# Patient Record
Sex: FEMALE | Race: WHITE
[De-identification: names, ages, dates, MRNs, and addresses within clinical notes are randomized per-mention and may not be internally consistent; named-entity substitution may affect disease eponyms.]

---

## 2019-12-19 ENCOUNTER — HOSPITAL ENCOUNTER (INPATIENT)
Dept: HOSPITAL 74 - YASAS | Age: 29
LOS: 4 days | Discharge: HOME | DRG: 773 | End: 2019-12-23
Attending: ALLERGY & IMMUNOLOGY | Admitting: ALLERGY & IMMUNOLOGY
Payer: COMMERCIAL

## 2019-12-19 VITALS — BODY MASS INDEX: 22.8 KG/M2

## 2019-12-19 DIAGNOSIS — Z88.0: ICD-10-CM

## 2019-12-19 DIAGNOSIS — F11.23: Primary | ICD-10-CM

## 2019-12-19 DIAGNOSIS — F17.210: ICD-10-CM

## 2019-12-19 DIAGNOSIS — J45.909: ICD-10-CM

## 2019-12-19 DIAGNOSIS — F13.230: ICD-10-CM

## 2019-12-19 DIAGNOSIS — E03.9: ICD-10-CM

## 2019-12-19 DIAGNOSIS — Z91.14: ICD-10-CM

## 2019-12-19 DIAGNOSIS — F19.282: ICD-10-CM

## 2019-12-19 DIAGNOSIS — K21.9: ICD-10-CM

## 2019-12-19 LAB
ALBUMIN SERPL-MCNC: 3.7 G/DL (ref 3.4–5)
ALP SERPL-CCNC: 54 U/L (ref 45–117)
ALT SERPL-CCNC: 18 U/L (ref 13–61)
ANION GAP SERPL CALC-SCNC: 9 MMOL/L (ref 8–16)
AST SERPL-CCNC: 7 U/L (ref 15–37)
BILIRUB SERPL-MCNC: 0.2 MG/DL (ref 0.2–1)
BUN SERPL-MCNC: 18.9 MG/DL (ref 7–18)
CALCIUM SERPL-MCNC: 9 MG/DL (ref 8.5–10.1)
CHLORIDE SERPL-SCNC: 104 MMOL/L (ref 98–107)
CO2 SERPL-SCNC: 28 MMOL/L (ref 21–32)
CREAT SERPL-MCNC: 0.9 MG/DL (ref 0.55–1.3)
DEPRECATED RDW RBC AUTO: 13.6 % (ref 11.6–15.6)
GLUCOSE SERPL-MCNC: 61 MG/DL (ref 74–106)
HCT VFR BLD CALC: 48.1 % (ref 32.4–45.2)
HGB BLD-MCNC: 15.8 GM/DL (ref 10.7–15.3)
MCH RBC QN AUTO: 31.2 PG (ref 25.7–33.7)
MCHC RBC AUTO-ENTMCNC: 32.9 G/DL (ref 32–36)
MCV RBC: 95 FL (ref 80–96)
PLATELET # BLD AUTO: 315 K/MM3 (ref 134–434)
PMV BLD: 10.9 FL (ref 7.5–11.1)
POTASSIUM SERPLBLD-SCNC: 3.5 MMOL/L (ref 3.5–5.1)
PROT SERPL-MCNC: 7.3 G/DL (ref 6.4–8.2)
RBC # BLD AUTO: 5.07 M/MM3 (ref 3.6–5.2)
SODIUM SERPL-SCNC: 140 MMOL/L (ref 136–145)
WBC # BLD AUTO: 11.2 K/MM3 (ref 4–10)

## 2019-12-19 PROCEDURE — HZ2ZZZZ DETOXIFICATION SERVICES FOR SUBSTANCE ABUSE TREATMENT: ICD-10-PCS | Performed by: ALLERGY & IMMUNOLOGY

## 2019-12-19 RX ADMIN — Medication SCH MG: at 21:09

## 2019-12-19 RX ADMIN — ALBUTEROL SULFATE PRN PUFF: 90 AEROSOL, METERED RESPIRATORY (INHALATION) at 17:06

## 2019-12-19 NOTE — PN
Teaching Attending Note


Name of Resident: Estrellita Parham





ATTENDING PHYSICIAN STATEMENT





I saw and evaluated the patient.


I reviewed the resident's note and discussed the case with the resident.


I agree with the resident's findings and plan as documented.








SUBJECTIVE:pt here requesting detox from  opiate use, reports 25-30 bags/day  

via  inhalation , denies IV  use since 6  mo  ago , relapsed after  finding out

  her father  has  pulmonary dz  , prior sobriety x  3 years  , latest use  

this morning 1  bag  , yesterday 1  bag, s/p hospitalization @ [facility]  for  

SOB , dx  w/ asthma , states was given 10 mg Methadone daily  latest dose 

yesterday  @ noon . 


benzo  - 5  /day  , reports w/d seizure several  years  ago   , latest use 

yesterday  , states was given Ativan @ [facility] while hospitalized . 


  has  6  yr  old dtr  , custody w/  [employer]  [facility] 


LMP -  spotting, has IUD 








OBJECTIVE: wnwd 


Resp  : wheezing all lung fields  , no distress  


 Vital Signs - 24 hr











  19





  12:06


 


Temperature 96.8 F L


 


Pulse Rate 64


 


Respiratory 20





Rate 


 


Blood Pressure 116/72

















 This report was requested by: Barbie Gonzalez | Reference #: [id number]


Others' Prescriptions


Patient Name:    Katelyn Bay    YOB: 1990


Address:    [address] [place] [address]    Sex:    Female


Rx Written    Rx Dispensed    Drug    Quantity    Days Supply    Prescriber Name


2019    dextroamp-amphetamin 30 mg tab    60    30    

SantiaSaulo christensen MD


2019    dextroamp-amphetamin 30 mg tab    60    30    

SantiaSaulo christensen MD


2019    dextroamp-amphetamin 30 mg tab    60    30    

SantiaSaulo christensen MD


2019    dextroamp-amphetamin 30 mg tab    60    30    

KimiaSaulo christensen MD


2019    dextroamp-amphetamin 30 mg tab    60    30    

SantiaSaulo christensen MD


04/10/2019    2019    dextroamp-amphetamin 30 mg tab    60    30    

SantiaSaulo christensen MD


2019    03/15/2019    dextroamp-amphetamin 30 mg tab    60    30    

Saulo Mortensen MD


02/15/2019    2019    dextroamp-amphetamin 30 mg tab    60    30    

Saulo Mortensen MD


2019    dextroamp-amphetamin 30 mg tab    60    30    

Saulo Mortensen MD


2018    dextroamp-amphetamin 30 mg tab    60    30    

Saulo Mortensen MD








ASSESSMENT AND PLAN: OUD  - Methadone detox . 


Sedative dependence - Librium detox

## 2019-12-19 NOTE — HP
COWS





- Scale


Resting Pulse: 0= [place] 80 or Below


Sweatin= Chills/Flushing


Restless Observation: 0= Sits Still


Pupil Size: 0= Normal to Room Light


Bone or Joint Aches: 1= Mild Discomfort


Runny Nose/ Eye Tearin= None


GI Upset > 30mins: 2= Nausea/Diarrhea


Tremor Observation: 0= None


Yawning Observation: 0= None


Anxiety or Irritability: 1=Feels Anxious/Irritable


Goose Flesh Skin: 0=Smooth Skin


COWS Score: 5





CIWA Score





- Admission Criteria


[facility]AS Guidelines: Admission for Medically Managed Detox: 


Requires at least one of the followin. CIWA greater than 12


2. Seizures within the past 24 hours


3. Delirium tremens within the past 24 hours


4. Hallucinations within the past 24 hours


5. Acute intervention needed for co  occurring medical disorder


6. Acute intervention needed for co  occurring psychiatric disorder


7. Severe withdrawal that cannot be handled at a lower level of care (continued


    vomiting, continued diarrhea, abnormal vital signs) requiring intravenous


    medication and/or fluids


8. Pregnancy








Admitting History and Physical





- Primary Care Physician


PCP: none





- Admission


Chief Complaint: heroin and benzo detox


History of Present Illness: 





Pt states she was clean for 3yrs, then 3 mo ago her dad got sick and she 

relapsed. She states she is using heroin and benzos daily. She uses 25-30 bags 

of heroin/ day which she is snorting. In the past she has shot up the heroin 

but not in the last 3mo. She is also taking benzos every day "at least 5 pills/

day" but amounts vary depending on what she can get. Pt currently works as a 

 which is how she pays for her drugs. Her last use of heroin was 1 bag 

this morning. She was also recently hospitalized and discharged from [facility] last night. There she was given methadone 10mg, and ativan to prevent 

withdrawals. The patient has 1 child who is 8 years old but she does not have 

custody. The child lives with her mother, who has full custody, [facility] in 

[place], [place]. 





Pt reports she does not have menstrual periods as she has an IUD





- Past Medical History


Pulmonary: Yes: Asthma


...LMP: 10/09/14


Psych: Yes: Anxiety, Depression


Endocrine: Yes: Hypothyroidism





- Smoking History


Smoking history: Current every day smoker


Aproximately how many cigarettes per day: 10





- Alcohol/Substance Use


Hx Alcohol Use: No





Admission ROS [facility]





- [facility]


Allergies/Adverse Reactions: 


 Allergies











Allergy/AdvReac Type Severity Reaction Status Date / Time


 


Penicillins Allergy   Verified 19 12:02














- Ebola screening


Have you traveled outside of the country in the last 21 days: No (N)


Have you had contact with anyone from an Ebola affected area: No


Do you have a fever: No





- Review of Systems


Constitutional: Chills, Diaphoresis, Changes in sleep


EENT: reports: Nose Congestion, Sinus Pressure.  denies: Eye Pain, Ear Pain


Respiratory: reports: Cough, Shortness of Breath (recently diagnosed with asthma

, takes albuterol)


Cardiac: denies: Chest Pain, Lightheadedness, Palpitations, Syncope


GI: reports: Constipated, Nausea.  denies: Diarrhea, Vomiting


Musculoskeletal: reports: Back Pain (low back pain), Joint Pain, Muscle Weakness

, Joint Stiffness


Integumentary: reports: No Symptoms Reported


Neuro: reports: No Symptoms reported


Endocrine: reports: Intolerance to Cold


Hematology: reports: No Symptoms Reported


Psychiatric: reports: Anxious, Depressed


Other Systems: Reviewed and Negative





Patient History





- Patient Medical History


Hx Anemia: No


Hx Asthma: Yes (ALBUTEROL SINCE 2014)


Hx Chronic Obstructive Pulmonary Disease (COPD): No


Hx Cancer: No


Hx Cardiac Disorders: No


Hx Congestive Heart Failure: No


Hx Hypertension: No


Hx Hypercholesterolemia: No


Hx Pacemaker: No


HX Cerebrovascular Accident: No


Hx Seizures: No


Hx Dementia: No


Hx Diabetes: No


Hx Gastrointestinal Disorders: Yes (DYSPEPSIA)


Hx Liver Disease: No


Hx Genitourinary Disorders: No


Hx Sexually Transmitted Disorders: No


Hx Renal Disease (ESRD): No


Hx Thyroid Disease: Yes (HYPOTHYROIDISM,NON-COMPLIANT WITH MEDS)


Hx Human Immunodeficiency Virus (HIV): No (NEGATIVE ON 10/22/14)


Hx Hepatitis C: No


Hx Depression: Yes (WAS ON CYMBALTA AND OTHERS)


Hx Suicide Attempt: No


Hx Bipolar Disorder: No


Hx Schizophrenia: No





- Patient Surgical History


Past Surgical History: No





- PPD History


Date: 10/25/14





- Reproductive History


Last Menstrual Period: 10/09/14





- Smoking Cessation


Smoking history: Current every day smoker


Aproximately how many cigarettes per day: 10


Hx Chewing Tobacco Use: No





- Substances abused


  ** Heroin


Substance route: Inhalation


Frequency: Daily


Amount used: 30 bags


Age of first use: 18


Date of last use: 19





  ** Alprazolam (Xanax)


Other (specify): alternate


Substance route: Oral


Frequency: Daily


Amount used: 4 - 5 pills


Age of first use: 18


Date of last use: 19





  ** Benzodiazepine (Klonopin)


Substance route: Oral


Frequency: Daily


Amount used: 4-5 pills


Age of first use: 18


Date of last use: 19





  ** Other


Other (specify): Ativan


Substance route: Oral


Frequency: Daily


Amount used: 4-5 pills


Age of first use: 18


Date of last use: 19





Admission Physical Exam [facility]





- Vital Signs


Vital Signs: 


 Vital Signs - 24 hr











  19





  12:06


 


Temperature 96.8 F L


 


Pulse Rate 64


 


Respiratory 20





Rate 


 


Blood Pressure 116/72














- Physical


General Appearance: Yes: Within Normal Limits, Nourished, Appropriately Dressed


HEENTM: Yes: Within Normal Limits, EOMI, Hearing grossly Normal, Normal ENT 

Inspection, Pharynx Normal


Respiratory: Yes: Chest Non-Tender, No Respiratory Distress, No Accessory 

Muscle Use, Wheezing, Other (diffuse wheezing throughout)


Neck: Yes: Within Normal Limits, Supple, Trachea in good position


Cardiology: Yes: Within Normal Limits, Regular Rhythm, Regular Rate, S1, S2.  No

: Murmur


Abdominal: Yes: Within Normal Limits, Normal Bowel Sounds, Non Tender, Flat, 

Soft


Back: Yes: Within Normal Limits, Normal Inspection.  No: CVA Tenderness


Musculoskeletal: Yes: Within Normal Limits, full range of Motion, Gait Steady


Extremities: Yes: Within Normal Limits, Normal Capillary Refill, Normal 

Inspection, Normal Range of Motion, Non-Tender


Neurological: Yes: Within Normal Limits, CNs II-XII NML intact, Fully Oriented, 

Alert, Motor Strength 5/5, Normal Mood/Affect


Integumentary: Yes: Within Normal Limits, Normal Color, Dry, Warm


Lymphatic: Yes: Within Normal Limits





Breathalyzer





- Breathalyzer


Breathalyzer: 0





Urine Drug Screen





- Test Device


Lot number: [id number]


Expiration date: 21





- Control


Is test valid?: Yes





- Results


Drug screen NEGATIVE: No


Urine drug screen results: MOP-Opiates, MTD-Methadone, BZO-Benzodiazepines

## 2019-12-20 RX ADMIN — Medication SCH TAB: at 10:44

## 2019-12-20 RX ADMIN — ALBUTEROL SULFATE PRN PUFF: 90 AEROSOL, METERED RESPIRATORY (INHALATION) at 05:57

## 2019-12-20 RX ADMIN — Medication SCH MG: at 21:45

## 2019-12-20 NOTE — PN
[facility] CIWA





- CIWA Score


Nausea/Vomitin-No Nausea/No Vomiting


Muscle Tremors: 2


Anxiety: 1-Mildly Anxious


Agitation: 2


Paroxysmal Sweats: 2


Orientation: 0-Oriented


Tacttile Disturbances: 0-None


Auditory Disturbances: 0-None


Visual Disturbances: 0-None


Headache: 0-None Present


CIWA-Ar Total Score: 7





[facility] COWS





- Scale


Resting Pulse: 0= [place] 80 or Below


Sweatin= Chills/Flushing


Restless Observation: 0= Sits Still


Pupil Size: 0= Normal to Room Light


Bone or Joint Aches: 1= Mild Discomfort


Runny Nose/ Eye Tearin= None


GI Upset > 30mins: 0= None


Tremor Observation of Outstretched Hands: 1= Tremor Felt, Not Seen


Yawning Observation: 2= >3x During Session


Anxiety or Irritability: 1=Feels Anxious/Irritable


Goose Flesh Skin: 0=Smooth Skin


COWS Score: 6





[facility] Progress Note (SOAP)


Subjective: 





interrupted sleep


melatonin not helping


sweats





Objective: 





19 13:27


 Vital Signs











Temperature  98.1 F   19 09:53


 


Pulse Rate  64   19 09:53


 


Respiratory Rate  16   19 09:53


 


Blood Pressure  102/61   19 09:53


 


O2 Sat by Pulse Oximetry (%)      








 Laboratory Tests











  19





  14:35 14:35 14:35


 


WBC  11.2 H  


 


RBC  5.07  


 


Hgb  15.8 H  


 


Hct  48.1 H D  


 


MCV  95.0  


 


MCH  31.2  


 


MCHC  32.9  


 


RDW  13.6  


 


Plt Count  315  D  


 


MPV  10.9  


 


Sodium   140 


 


Potassium   3.5 


 


Chloride   104 


 


Carbon Dioxide   28 


 


Anion Gap   9 


 


BUN   18.9 H 


 


Creatinine   0.9 


 


Est GFR (CKD-EPI)AfAm   101.56 


 


Est GFR (CKD-EPI)NonAf   87.63 


 


Random Glucose   61 L 


 


Calcium   9.0 


 


Total Bilirubin   0.2 


 


AST   7 L 


 


ALT   18 


 


Alkaline Phosphatase   54 


 


Total Protein   7.3 


 


Albumin   3.7 


 


RPR Titer    Nonreactive








labs noted


aaox3


ambulating


no acute distress


wbc elevated


will repeat labs


Assessment: 





19 13:28


withdrawals


Plan: 





continue detox


increase fluids


repeat labs


seroquel 50mg qhs only while in detox

## 2019-12-21 LAB
BASOPHILS # BLD: 0.6 % (ref 0–2)
DEPRECATED RDW RBC AUTO: 13.5 % (ref 11.6–15.6)
EOSINOPHIL # BLD: 10.2 % (ref 0–4.5)
HCT VFR BLD CALC: 46.6 % (ref 32.4–45.2)
HGB BLD-MCNC: 15.5 GM/DL (ref 10.7–15.3)
LYMPHOCYTES # BLD: 37.4 % (ref 8–40)
MCH RBC QN AUTO: 30.9 PG (ref 25.7–33.7)
MCHC RBC AUTO-ENTMCNC: 33.2 G/DL (ref 32–36)
MCV RBC: 93.3 FL (ref 80–96)
MONOCYTES # BLD AUTO: 7.8 % (ref 3.8–10.2)
NEUTROPHILS # BLD: 44 % (ref 42.8–82.8)
PLATELET # BLD AUTO: 258 K/MM3 (ref 134–434)
PMV BLD: 10.3 FL (ref 7.5–11.1)
RBC # BLD AUTO: 5 M/MM3 (ref 3.6–5.2)
WBC # BLD AUTO: 9.1 K/MM3 (ref 4–10)

## 2019-12-21 RX ADMIN — Medication SCH TAB: at 10:46

## 2019-12-21 RX ADMIN — Medication SCH MG: at 22:09

## 2019-12-21 RX ADMIN — Medication PRN MG: at 22:09

## 2019-12-21 NOTE — PN
[facility] CIWA





- CIWA Score


Nausea/Vomitin-Mild Nausea/No Vomiting


Muscle Tremors: 2


Anxiety: 4-Mod. Anxious/Guarded


Agitation: 4-Moderately Restless


Paroxysmal Sweats: 1-Minimal Palms Moist


Orientation: 0-Oriented


Tacttile Disturbances: 0-None


Auditory Disturbances: 0-None


Visual Disturbances: 0-None


Headache: 2-Mild


CIWA-Ar Total Score: 14





[facility] COWS





- Scale


Resting Pulse: 1= IN 


Sweatin= Chills/Flushing


Restless Observation: 1= Difficult to Sit Still


Pupil Size: 1= Pupils >than Normal


Bone or Joint Aches: 1= Mild Discomfort


Runny Nose/ Eye Tearin= Nasal Congestion


GI Upset > 30mins: 1= Stomach Cramp


Tremor Observation of Outstretched Hands: 1= Tremor Felt, Not Seen


Yawning Observation: 1= 1-2x During Session


Anxiety or Irritability: 2=Irritable/Anxious


Goose Flesh Skin: 0=Smooth Skin


COWS Score: 11





[facility] Progress Note (SOAP)


Subjective: 





pt states feeling fine with detox protocols





O:


 Vital Signs - 24 hr











  19





  18:24 21:07 00:30


 


Temperature 98.5 F 97.7 F 


 


Pulse Rate 78 84 


 


Respiratory 18 17 18





Rate   


 


Blood Pressure 122/68 120/66 














  19





  03:30 08:11 10:02


 


Temperature  97.7 F 97.9 F


 


Pulse Rate  65 65


 


Respiratory 16 16 16





Rate   


 


Blood Pressure  108/57 L 108/57 L














  19





  14:00


 


Temperature 97.3 F L


 


Pulse Rate 72


 


Respiratory 16





Rate 


 


Blood Pressure 110/72








 Laboratory Tests











  19





  14:35 14:35 14:35


 


WBC  11.2 H  


 


RBC  5.07  


 


Hgb  15.8 H  


 


Hct  48.1 H D  


 


MCV  95.0  


 


MCH  31.2  


 


MCHC  32.9  


 


RDW  13.6  


 


Plt Count  315  D  


 


MPV  10.9  


 


Absolute Neuts (auto)   


 


Neutrophils %   


 


Lymphocytes %   


 


Monocytes %   


 


Eosinophils %   


 


Basophils %   


 


Nucleated RBC %   


 


Sodium   140 


 


Potassium   3.5 


 


Chloride   104 


 


Carbon Dioxide   28 


 


Anion Gap   9 


 


BUN   18.9 H 


 


Creatinine   0.9 


 


Est GFR (CKD-EPI)AfAm   101.56 


 


Est GFR (CKD-EPI)NonAf   87.63 


 


Random Glucose   61 L 


 


Calcium   9.0 


 


Total Bilirubin   0.2 


 


AST   7 L 


 


ALT   18 


 


Alkaline Phosphatase   54 


 


Total Protein   7.3 


 


Albumin   3.7 


 


RPR Titer    Nonreactive














  19





  07:40


 


WBC  9.1


 


RBC  5.00


 


Hgb  15.5 H


 


Hct  46.6 H


 


MCV  93.3


 


MCH  30.9


 


MCHC  33.2


 


RDW  13.5


 


Plt Count  258


 


MPV  10.3


 


Absolute Neuts (auto)  4.0


 


Neutrophils %  44.0


 


Lymphocytes %  37.4


 


Monocytes %  7.8


 


Eosinophils %  10.2 H


 


Basophils %  0.6


 


Nucleated RBC %  0


 


Sodium 


 


Potassium 


 


Chloride 


 


Carbon Dioxide 


 


Anion Gap 


 


BUN 


 


Creatinine 


 


Est GFR (CKD-EPI)AfAm 


 


Est GFR (CKD-EPI)NonAf 


 


Random Glucose 


 


Calcium 


 


Total Bilirubin 


 


AST 


 


ALT 


 


Alkaline Phosphatase 


 


Total Protein 


 


Albumin 


 


RPR Titer 








a/p: OUD and Benzo use disorder: on methadone and librium detox protocols

## 2019-12-22 RX ADMIN — Medication PRN MG: at 22:23

## 2019-12-22 RX ADMIN — Medication SCH MG: at 22:23

## 2019-12-22 RX ADMIN — Medication SCH TAB: at 10:47

## 2019-12-22 NOTE — PN
[facility] CIWA





- CIWA Score


Nausea/Vomitin-No Nausea/No Vomiting


Muscle Tremors: None


Anxiety: 2


Agitation: 0-Normal Activity


Paroxysmal Sweats: 2


Orientation: 0-Oriented


Tacttile Disturbances: 0-None


Auditory Disturbances: 0-None


Visual Disturbances: 0-None


Headache: 0-None Present


CIWA-Ar Total Score: 4





[facility] COWS





- Scale


Resting Pulse: 1= [place] 


Sweatin= Chills/Flushing


Restless Observation: 0= Sits Still


Pupil Size: 0= Normal to Room Light


Bone or Joint Aches: 0= None


Runny Nose/ Eye Tearin= None


GI Upset > 30mins: 0= None


Tremor Observation of Outstretched Hands: 0= None


Yawning Observation: 0= None


Anxiety or Irritability: 2=Irritable/Anxious


Goose Flesh Skin: 0=Smooth Skin


COWS Score: 4





[facility] Progress Note (SOAP)


Subjective: 





c/o mild withdrawal symptoms.


Objective: 





19 13:47


 Vital Signs











  19





  06:40 10:16


 


Temperature 97.5 F L 97.5 F L


 


Pulse Rate 67 90


 


Respiratory 18 16





Rate  


 


Blood Pressure 115/60 124/76








 Laboratory Last Values











WBC  9.1 K/mm3 (4.0-10.0)   19  07:40    


 


RBC  5.00 M/mm3 (3.60-5.2)   19  07:40    


 


Hgb  15.5 GM/dL (10.7-15.3)  H  19  07:40    


 


Hct  46.6 % (32.4-45.2)  H  19  07:40    


 


MCV  93.3 fl (80-96)   19  07:40    


 


MCH  30.9 pg (25.7-33.7)   19  07:40    


 


MCHC  33.2 g/dl (32.0-36.0)   19  07:40    


 


RDW  13.5 % (11.6-15.6)   19  07:40    


 


Plt Count  258 K/MM3 (134-434)   19  07:40    


 


MPV  10.3 fl (7.5-11.1)   19  07:40    


 


Absolute Neuts (auto)  4.0 K/mm3 (1.5-8.0)   19  07:40    


 


Neutrophils %  44.0 % (42.8-82.8)   19  07:40    


 


Lymphocytes %  37.4 % (8-40)   19  07:40    


 


Monocytes %  7.8 % (3.8-10.2)   19  07:40    


 


Eosinophils %  10.2 % (0-4.5)  H  19  07:40    


 


Basophils %  0.6 % (0-2.0)   19  07:40    


 


Nucleated RBC %  0 % (0-0)   19  07:40    


 


Sodium  140 mmol/L (136-145)   19  14:35    


 


Potassium  3.5 mmol/L (3.5-5.1)   19  14:35    


 


Chloride  104 mmol/L ()   19  14:35    


 


Carbon Dioxide  28 mmol/L (21-32)   19  14:35    


 


Anion Gap  9 MMOL/L (8-16)   19  14:35    


 


BUN  18.9 mg/dL (7-18)  H  19  14:35    


 


Creatinine  0.9 mg/dL (0.55-1.3)   19  14:35    


 


Est GFR (CKD-EPI)AfAm  101.56   19  14:35    


 


Est GFR (CKD-EPI)NonAf  87.63   19  14:35    


 


Random Glucose  61 mg/dL ()  L  19  14:35    


 


Calcium  9.0 mg/dL (8.5-10.1)   19  14:35    


 


Total Bilirubin  0.2 mg/dL (0.2-1)   19  14:35    


 


AST  7 U/L (15-37)  L  19  14:35    


 


ALT  18 U/L (13-61)   19  14:35    


 


Alkaline Phosphatase  54 U/L ()   19  14:35    


 


Total Protein  7.3 g/dl (6.4-8.2)   19  14:35    


 


Albumin  3.7 g/dl (3.4-5.0)   19  14:35    


 


RPR Titer  Nonreactive  (NONREACTIVE)   19  14:35    








Labs noted.


Assessment: 





19 13:47


AOX3, in no acute respiratory distress.


Full ROM, ambulating in the unit.


Mild Withdrawal symptoms.


For d/c tomorrow.


19 13:48





Plan: 





continue detox.


D/C in AM.

## 2019-12-23 VITALS — HEART RATE: 82 BPM | TEMPERATURE: 97.7 F | SYSTOLIC BLOOD PRESSURE: 108 MMHG | DIASTOLIC BLOOD PRESSURE: 64 MMHG

## 2019-12-23 RX ADMIN — Medication SCH TAB: at 10:24

## 2019-12-23 NOTE — DS
[facility] Detox Discharge Summary


Admission Date: 


12/19/19





Discharge Date: 12/23/19





- History


Present History: Opioid Dependence, Sedative Dependence





- Physical Exam Results


Vital Signs: 


 Vital Signs











Temperature  97.9 F   12/23/19 05:00


 


Pulse Rate  72   12/23/19 05:00


 


Respiratory Rate  16   12/23/19 05:00


 


Blood Pressure  103/62   12/23/19 05:00


 


O2 Sat by Pulse Oximetry (%)      











Pertinent Admission Physical Exam Findings: 





 Vital Signs











Temperature  97.9 F   12/23/19 05:00


 


Pulse Rate  72   12/23/19 05:00


 


Respiratory Rate  16   12/23/19 05:00


 


Blood Pressure  103/62   12/23/19 05:00


 


O2 Sat by Pulse Oximetry (%)      








 Laboratory Tests











  12/19/19 12/19/19 12/19/19





  14:35 14:35 14:35


 


WBC  11.2 H  


 


RBC  5.07  


 


Hgb  15.8 H  


 


Hct  48.1 H D  


 


MCV  95.0  


 


MCH  31.2  


 


MCHC  32.9  


 


RDW  13.6  


 


Plt Count  315  D  


 


MPV  10.9  


 


Absolute Neuts (auto)   


 


Neutrophils %   


 


Lymphocytes %   


 


Monocytes %   


 


Eosinophils %   


 


Basophils %   


 


Nucleated RBC %   


 


Sodium   140 


 


Potassium   3.5 


 


Chloride   104 


 


Carbon Dioxide   28 


 


Anion Gap   9 


 


BUN   18.9 H 


 


Creatinine   0.9 


 


Est GFR (CKD-EPI)AfAm   101.56 


 


Est GFR (CKD-EPI)NonAf   87.63 


 


Random Glucose   61 L 


 


Calcium   9.0 


 


Total Bilirubin   0.2 


 


AST   7 L 


 


ALT   18 


 


Alkaline Phosphatase   54 


 


Total Protein   7.3 


 


Albumin   3.7 


 


RPR Titer    Nonreactive














  12/21/19





  07:40


 


WBC  9.1


 


RBC  5.00


 


Hgb  15.5 H


 


Hct  46.6 H


 


MCV  93.3


 


MCH  30.9


 


MCHC  33.2


 


RDW  13.5


 


Plt Count  258


 


MPV  10.3


 


Absolute Neuts (auto)  4.0


 


Neutrophils %  44.0


 


Lymphocytes %  37.4


 


Monocytes %  7.8


 


Eosinophils %  10.2 H


 


Basophils %  0.6


 


Nucleated RBC %  0


 


Sodium 


 


Potassium 


 


Chloride 


 


Carbon Dioxide 


 


Anion Gap 


 


BUN 


 


Creatinine 


 


Est GFR (CKD-EPI)AfAm 


 


Est GFR (CKD-EPI)NonAf 


 


Random Glucose 


 


Calcium 


 


Total Bilirubin 


 


AST 


 


ALT 


 


Alkaline Phosphatase 


 


Total Protein 


 


Albumin 


 


RPR Titer 








aaox3


ambulating


no acute distress





- Treatment


Hospital Course: Detox Protocol Followed, Detoxed Safely, Responded well, 

Discharged Condition Good, Rehab Referral Accepted


Patient has Accepted a Rehab Referral to: referred to sally garcia [place]





- Medication


Discharge Medications: 


Ambulatory Orders





Albuterol Sulfate Inhaler - [Ventolin Hfa Inhaler -] 1 - 2 inh PO QID PRN 12/19/ 19 











- Diagnosis


(1) Benzodiazepine dependence


Current Visit: Yes   Status: Chronic   





(2) Bronchial asthma


Current Visit: No   Status: Acute   





(3) GERD (gastroesophageal reflux disease)


Current Visit: Yes   Status: Chronic   


Qualifiers: 


   Esophagitis presence: without esophagitis   Qualified Code(s): K21.9 - Gastro

-esophageal reflux disease without esophagitis   





(4) Heroin dependence


Current Visit: No   Status: Acute   





(5) Substance-induced sleep disorder


Current Visit: No   Status: Acute   





- AMA


Did Patient Leave Against Medical Advice: No